# Patient Record
Sex: FEMALE | Race: WHITE | NOT HISPANIC OR LATINO | Employment: OTHER | URBAN - METROPOLITAN AREA
[De-identification: names, ages, dates, MRNs, and addresses within clinical notes are randomized per-mention and may not be internally consistent; named-entity substitution may affect disease eponyms.]

---

## 2020-03-03 ENCOUNTER — TRANSFERRED RECORDS (OUTPATIENT)
Dept: HEALTH INFORMATION MANAGEMENT | Facility: CLINIC | Age: 65
End: 2020-03-03

## 2020-07-21 ENCOUNTER — TRANSFERRED RECORDS (OUTPATIENT)
Dept: HEALTH INFORMATION MANAGEMENT | Facility: CLINIC | Age: 65
End: 2020-07-21

## 2020-07-28 ENCOUNTER — TRANSFERRED RECORDS (OUTPATIENT)
Dept: HEALTH INFORMATION MANAGEMENT | Facility: CLINIC | Age: 65
End: 2020-07-28

## 2020-08-18 ENCOUNTER — TRANSFERRED RECORDS (OUTPATIENT)
Dept: HEALTH INFORMATION MANAGEMENT | Facility: CLINIC | Age: 65
End: 2020-08-18

## 2020-08-20 PROBLEM — E11.9 TYPE 2 DIABETES MELLITUS (H): Status: ACTIVE | Noted: 2017-02-06

## 2020-08-20 PROBLEM — H04.203 WATERY EYES: Status: ACTIVE | Noted: 2019-05-13

## 2020-08-20 PROBLEM — E87.5 HYPERKALEMIA: Status: ACTIVE | Noted: 2018-11-09

## 2020-08-20 PROBLEM — E11.311 DIABETIC MACULAR EDEMA OF RIGHT EYE (H): Status: ACTIVE | Noted: 2017-06-13

## 2020-08-20 PROBLEM — N18.5 CKD (CHRONIC KIDNEY DISEASE) STAGE 5, GFR LESS THAN 15 ML/MIN (H): Status: ACTIVE | Noted: 2019-03-08

## 2020-08-20 PROBLEM — E11.39: Status: ACTIVE | Noted: 2017-10-31

## 2020-08-20 PROBLEM — E87.70 FLUID OVERLOAD, UNSPECIFIED: Status: ACTIVE | Noted: 2018-11-09

## 2020-08-20 PROBLEM — H35.373 EPIRETINAL MEMBRANE (ERM) OF BOTH EYES: Status: ACTIVE | Noted: 2018-07-05

## 2020-08-20 PROBLEM — R80.9 PROTEINURIA: Status: ACTIVE | Noted: 2018-11-09

## 2020-08-20 PROBLEM — N25.81 HYPERPARATHYROIDISM DUE TO RENAL INSUFFICIENCY (H): Status: ACTIVE | Noted: 2019-05-06

## 2020-08-20 PROBLEM — J18.9 PNEUMONIA: Status: ACTIVE | Noted: 2018-03-19

## 2020-08-20 PROBLEM — G72.3 ADYNAMIA: Status: ACTIVE | Noted: 2017-11-09

## 2020-08-20 PROBLEM — H43.10: Status: ACTIVE | Noted: 2017-10-31

## 2020-08-20 PROBLEM — N18.4 CKD (CHRONIC KIDNEY DISEASE) STAGE 4, GFR 15-29 ML/MIN (H): Status: ACTIVE | Noted: 2017-05-12

## 2020-08-20 PROBLEM — N17.9 AKI (ACUTE KIDNEY INJURY) (H): Status: ACTIVE | Noted: 2018-04-06

## 2020-08-20 PROBLEM — Z13.5 SCREENING FOR EYE CONDITION: Status: ACTIVE | Noted: 2017-02-06

## 2020-08-20 PROBLEM — N18.6 ANEMIA IN END-STAGE RENAL DISEASE (H): Status: ACTIVE | Noted: 2019-05-06

## 2020-08-20 PROBLEM — H25.13 AGE-RELATED NUCLEAR CATARACT, BILATERAL: Status: ACTIVE | Noted: 2017-08-30

## 2020-08-20 PROBLEM — E11.3299 BDR (BACKGROUND DIABETIC RETINOPATHY) (H): Status: ACTIVE | Noted: 2017-10-31

## 2020-08-20 PROBLEM — R51.9 HEADACHE: Status: ACTIVE | Noted: 2017-11-09

## 2020-08-20 PROBLEM — H43.10 VITREOUS HEMORRHAGE (H): Status: ACTIVE | Noted: 2017-02-06

## 2020-08-20 PROBLEM — S09.90XA INJURY OF HEAD: Status: ACTIVE | Noted: 2017-11-09

## 2020-08-20 PROBLEM — R25.1 TREMOR: Status: ACTIVE | Noted: 2019-08-23

## 2020-08-20 PROBLEM — R11.2 NAUSEA AND VOMITING: Status: ACTIVE | Noted: 2017-11-09

## 2020-08-20 PROBLEM — H26.9 CATARACT OF BOTH EYES: Status: ACTIVE | Noted: 2018-04-09

## 2020-08-20 PROBLEM — R50.9 FEVER: Status: ACTIVE | Noted: 2017-11-09

## 2020-08-20 PROBLEM — D63.1 ANEMIA IN END-STAGE RENAL DISEASE (H): Status: ACTIVE | Noted: 2019-05-06

## 2020-08-20 PROBLEM — N04.9 NEPHROTIC SYNDROME: Status: ACTIVE | Noted: 2018-11-09

## 2020-10-06 ENCOUNTER — TRANSFERRED RECORDS (OUTPATIENT)
Dept: HEALTH INFORMATION MANAGEMENT | Facility: CLINIC | Age: 65
End: 2020-10-06

## 2020-10-06 ENCOUNTER — TELEPHONE (OUTPATIENT)
Dept: TRANSPLANT | Facility: CLINIC | Age: 65
End: 2020-10-06

## 2020-10-06 LAB — PAP-ABSTRACT: NORMAL

## 2020-10-27 ENCOUNTER — TELEPHONE (OUTPATIENT)
Dept: TRANSPLANT | Facility: CLINIC | Age: 65
End: 2020-10-27

## 2020-12-08 ENCOUNTER — TELEPHONE (OUTPATIENT)
Dept: TRANSPLANT | Facility: CLINIC | Age: 65
End: 2020-12-08

## 2020-12-08 NOTE — TELEPHONE ENCOUNTER
Called pt's home and mobile numbers to confirm she was able to move her dialysis time for 12/9/20 to the afternoon to accommodate a virtual PKE appointment in the morning. Left my contact info for her to return my call.

## 2020-12-08 NOTE — PROGRESS NOTES
"Chief Complaint   Patient presents with     Transplant Evaluation     kidney/pancreas     Height 1.753 m (5' 9\"), weight 95.3 kg (210 lb), not currently breastfeeding.    Selene Denny is a 65 year old female who is being evaluated via a billable video visit.    This telemedicine visit was completed while the patient is out of state in order to maintain continuity of transplant care and to eliminate risk of travel to [place] during COVID-19 pandemic.    The patient has been notified of following:     \"This video visit will be conducted via a call between you and your physician/provider. We have found that certain health care needs can be provided without the need for an in-person physical exam.  This service lets us provide the care you need with a video conversation.  If a prescription is necessary we can send it directly to your pharmacy.  If lab work is needed we can place an order for that and you can then stop by our lab to have the test done at a later time.    Video visits are billed at different rates depending on your insurance coverage.  Please reach out to your insurance provider with any questions.    If during the course of the call the physician/provider feels a video visit is not appropriate, you will not be charged for this service.\"    Patient has given verbal consent for Video visit? Yes  How would you like to obtain your AVS? MyChart  If you are dropped from the video visit, the video invite should be resent to: Other e-mail: USE [employer]- PATIENT IN THE WAITING ROOM  Will anyone else be joining your video visit? No    Video-Visit Details    Type of service:  Video Visit    Video Start Time: 8:37 AM  Video End Time: 9:14 AM    Originating Location (pt. Location): Home    Distant Location (provider location):  [facility]     Platform used for Video Visit: NORMA Langley MD      TRANSPLANT NEPHROLOGY RECIPIENT EVALUATION " NOTE    Assessment and Plan:  # Kidney/Pancreas Transplant Evaluation: Patient is a fair candidate overall. Benefits of a living donor transplant were discussed.    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     # DM Type 2: currently controlled with approximately 35-45 units insulin daily. Given comorbidities, she may be at higher risk for pancreas transplant.    # Cardiac Risk: she will need risk assessment given known history of CAD s/p PCI in 2016 (with residual 50% stenosis of 1st diagonal), ischemic cardiomyopathy (20% at time of PCI, improved to 60% as of 2017), and ECHO abnormalities (aortic stenosis (mean valve gradient 16 mmHg, valve area 1.3 cm2) and pulmonary HTN, not repeated since 2017).    # CLL: diagnosed and treated in 2015. Followed by local oncologist. No evidence of progression as of July 2020.    # Diarrhea: chronic. Improving over the last month with dietary changes, but now with fecal incontinence, and following with local GI. Work up thus far has been unrevealing. No issues with dehydration or passing of pills in stool. She should continue to follow with local GI provider.     # MGUS: will repeat SPEP and serum FLC.     # PAD: obtain images from August 2020 CT c/a/p without contrast done at [place].     # Sensation of Incomplete Bladder Emptying: will need PVR.    # Health Maintenance: Colonoscopy: need records, Mammogram: due this month, PAP: Up to date (October 2020, NIL, neg HPV) and Dental: should be updated if not done in past 6-12 months.     Discussed the risks and benefits of a transplant, including the risk of surgery and immunosuppression medications.  Patient's overall evaluation will be discussed in the Transplant Program's regular meeting with a final recommendation on the patients suitability for transplant to be made at that time.  Patient was seen in conjunction with Dr. Gerson Dorantes as part of a shared  visit.    PHYSICIAN ATTESTATION AND EVALUATION  Patient was seen by myself, Dr. Gerson Dorantes, in conjunction with Akila Layne PA-C as part of a shared visit.    I personally reviewed the patient's past medical and surgical history, vital signs, medications and pertinent laboratory results and radiology findings.  Present and past medical history, along with significant physical exam findings were all reviewed with CHARISMA.    Allen findings:  Selene Denny is a 65 year old year old female with ESKD from diabetes mellitus type 2, who presents for kidney/pancreas transplant evaluation.  Patient reports feeling okay overall with some medical complaints.    Key management decisions made by me and discussed with CHARISMA include the following, with full Assessment and Plan noted above by CHARISMA:  # Kidney/Pancreas Transplant Evaluation: Patient is a fair candidate overall. Benefits of a living donor transplant were discussed.    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    # DM Type 2: Stable on insuline, but with end-organ disease.  She may benefit from a pancreas transplant, but would be at higher risk.    # Cardiac Risk: Patient with known CAD, s/p PCI and h/o ischemic cardiomyopathy, but improved to normal with last cardiac echo.  Patient will require Cardiology evaluation prior to transplant.    # PAD: Noted on previous CT imaging.  Will obtain CT scan and have Transplant Surgery review.    # CLL: Patient was diagnosed in 2015 with no evidence of progression.  Followed by Oncology.    # Chronic Diarrhea: Some improvement lately, but also has fecal incontinence.  Patient is being evaluated by local GI.    # MGUS: Followed by Heme/Onc and will repeat serum kappa/lambda light chains and SPEP.    # Sensation of Incomplete Bladder Emptying: Will obtain PVR and if abnormal, recommend Urology evaluation.    Gerson Dorantes,  MD    Evaluation:  Selene Denny was seen in consultation at the request of Dr. Maris Rajput for evaluation as a potential kidney/pancreas transplant recipient.    Reason for Visit:  Selene Denny is a 65-year-old female with ESKD from diabetes mellitus type 2, who presents for kidney/pancreas transplant evaluation.    History of Present Illness:         Kidney Disease Hx: Diagnosed with diabetic nephropathy by kidney biopsy in January 2016 to evaluate nephrotic-range proteinuria with normal creatinine. Her kidney function continued to deteriorate, and she initiated dialysis in May 2019, which is going well. She still has urine output. She has sensation of incomplete bladder emptying. She takes midodrine 5 mg (1-2 pills) on dialysis days only when needed. She was evaluated at [facility] for kidney transplant this Summer 2020, but is here as she is interested in pancreas transplant. She does not recall previously being referred earlier for transplant evaluation.       Kidney Disease Dx: Diabetes mellitus type 2       Biopsy Proven: Yes         On Dialysis: Yes, Date initiated: May 2019 and Dialysis Type: [facility]       Primary Nephrologist: [facility]       H/o Kidney Stones: No       H/o Recurrent/Frequent UTI: No         Diabetic Hx: Type 2        Diagnosis Date: 1995       Medication History: on oral medications before transitioning to insulin a few years ago. Currently using novolog 5-10 units with meals and 13 units of lantus. Has CGM. Followed by endocrine.       Diabetic Control: Borderline control (HbA1c 7-9%)   Last HbA1c: 7.2%       Hypoglycemic Unawareness: ER visit August 2020 with sugars of 20 and was asymptomatic, but no other events       End-Organ Damage due to DM: Nephropathy and Peripheral neuropathy. Foot ulcers. No retinopathy         Cardiac/Vascular Disease Risk Factors:        - CAD s/p PCI to mid circumflex and mid LAD with SENDY March 2016. There was also an 80% lesion in the  1st diagonal that was treated with angioplasty but only partial improvement with 50% residual stenosis. EF 20% at that time. EF improved to 60% on last ECHO in 2017. That ECHO also showed aortic stenosis (mean valve gradient 16 mmHg, valve area 1.3 cm2), and moderate pulmonary HTN with PA systolic pressure of 58 mmHg.         Viral Serology Status       CMV IgG Antibody: Unknown       EBV IgG Antibody: Unknown         Volume Status/Weight:        BMI: Body mass index is 31.01 kg/m .         Functional Capacity/Frailty:        She stays physically active with walking. Does elliptical at home for 15 minutes at a time. Goes out to local shopping areas to walk around stores. Can walk for 60 minutes uninterrupted. No chest pain, SOB, or claudication.      Fatigue/Decreased Energy: [x] No [] Yes    Chest Pain or SOB with Exertion: [x] No [] Yes    Significant Weight Change: [x] No [] Yes    Nausea, Vomiting or Diarrhea: [x] No [] Yes    Fever, Sweats or Chills:  [x] No [] Yes    Leg Swelling [x] No [] Yes        History of Cancer:   - CLL: diagnosed January 2015. Treated with bendamustane and rituximab, therapy completed in July 2015. Continues to follow with oncology. Last seen July 2020 without evidence of progression.    - MGUS: IgG kappa, identified 2016    Other Significant Medical Issues:   - Chronic diarrhea: present since starting metformin years ago. Now following with local GI. Symptoms improved in the last month with keeping a food diary and avoiding food triggers. Uses imodium as needed. Has undergone extensive work up, unclear etiology, but attributed to diabetes. Recent October 2020 GI note mentions patient has fecal incontinence during the day and night. Patient reported symptoms occurring 1-2 times per week, having diarrhea for about 4-5 hours, then resolves. Non-bloody, non-watery.    Review of Systems:  A comprehensive review of systems was obtained and negative, except as noted in the HPI or  PMH.    Past Medical History:   Medical record was reviewed and PMH was discussed with patient and noted below.  Past Medical History:   Diagnosis Date     Chronic diarrhea      CLL (chronic lymphocytic leukemia) (H)      Coronary artery disease     Heart Attack      End stage kidney disease (H)      History of blood transfusion     2017     Hypertension      MGUS (monoclonal gammopathy of unknown significance)      PAD (peripheral artery disease) (H)      Type 2 diabetes mellitus (H)        Past Social History:   Past Surgical History:   Procedure Laterality Date     BIOPSY KIDNEY  01/2016     CHOLECYSTECTOMY       COLONOSCOPY      2017 at [place]      CREATE FISTULA ARTERIOVENOUS UPPER EXTREMITY       CV ANGIOGRAPHY       HEART CATH, ANGIOPLASTY       Personal history of bleeding or anesthesia problems: No    Family History:  Family History   Problem Relation Age of Onset     Kidney Disease No family hx of        Personal History:   Social History     Socioeconomic History     Marital status:      Spouse name: Not on file     Number of children: Not on file     Years of education: Not on file     Highest education level: Not on file   Occupational History     Not on file   Social Needs     Financial resource strain: Not on file     Food insecurity     Worry: Not on file     Inability: Not on file     Transportation needs     Medical: Not on file     Non-medical: Not on file   Tobacco Use     Smoking status: Never Smoker     Smokeless tobacco: Never Used   Substance and Sexual Activity     Alcohol use: Never     Frequency: Never     Drug use: Never     Sexual activity: Not on file   Lifestyle     Physical activity     Days per week: Not on file     Minutes per session: Not on file     Stress: Not on file   Relationships     Social connections     Talks on phone: Not on file     Gets together: Not on file     Attends Hindu service: Not on file     Active member of club or organization: Not on file      Attends meetings of clubs or organizations: Not on file     Relationship status: Not on file     Intimate partner violence     Fear of current or ex partner: Not on file     Emotionally abused: Not on file     Physically abused: Not on file     Forced sexual activity: Not on file   Other Topics Concern     Parent/sibling w/ CABG, MI or angioplasty before 65F 55M? Not Asked   Social History Narrative     Not on file       Allergies:  No Known Allergies    Medications:  Current Outpatient Medications   Medication Sig     acetaminophen (TYLENOL) 325 MG tablet Take 325-650 mg by mouth     atorvastatin (LIPITOR) 40 MG tablet Take 40 mg by mouth     blood glucose (CONTOUR NEXT TEST) test strip USE TO TEST BLOOD SUGARS 3 TIMES A DAY     carvedilol (COREG) 12.5 MG tablet Take 12.5 mg by mouth     darbepoetin mike (ARANESP) 25 MCG/ML injection      ferric gluconate (FERRLECIT) 12.5 MG/ML injection      furosemide (LASIX) 40 MG tablet Take 80 mg by mouth     insulin aspart (NOVOLOG FLEXPEN) 100 UNIT/ML pen Inject 5 units with breakfast, 8 units with lunch and supper plus correction of 1 unit per 50 points over 150;  Max daily 80 Units     insulin degludec (TRESIBA) 100 UNIT/ML pen Inject 13 Units Subcutaneous     levothyroxine (SYNTHROID/LEVOTHROID) 50 MCG tablet TAKE ONE TAB BY MOUTH EVERY DAY     loperamide (IMODIUM) 2 MG capsule TAKE 2 CAPS AFTER THE FIRST LOOSE STOOL THEN 1 CAP AFTER EACH LOOSE STOOL MAX 8 CAPS PER DAY     Multiple Vitamins-Minerals (PRESERVISION/LUTEIN) CAPS Take 1 capsule by mouth     polyvinyl alcohol (LIQUIFILM TEARS) 1.4 % ophthalmic solution 1 drop     sevelamer carbonate (RENVELA) 800 MG tablet TAKE 1 TABLET BY MOUTH 3X PER DAY WITH MEALS DO NOT GIVE ON AN EMPTY STOMACH MUST TAKE WITH FOOD     No current facility-administered medications for this visit.          Exam:  GENERAL: Healthy, alert and no distress  EYES: Eyes grossly normal to inspection.  No discharge or erythema, or obvious  scleral/conjunctival abnormalities.  RESP: No audible wheeze, cough, or visible cyanosis.  No visible retractions or increased work of breathing.    SKIN: Visible skin clear. No significant rash, abnormal pigmentation or lesions.  NEURO: Cranial nerves grossly intact.  Mentation and speech appropriate for age.  PSYCH: Mentation appears normal, affect normal/bright, judgement and insight intact, normal speech and appearance well-groomed.

## 2020-12-08 NOTE — TELEPHONE ENCOUNTER
Called pt to confirm she will be able to attend her virtual PKE appointment tomorrow (12/9/20).  Pt stated she will be home from 8 AM -11:30 AM and then she will be leaving for dialysis.  Pt told me she received a code that is not working to do the appointment on [facility].  Will escalate this to Lucero Brady.

## 2020-12-08 NOTE — Clinical Note
Akila Rivas had asked me to reach out and confirm this pt had moved her dialysis from AM to PM to accommodate her virtual PKE appointment. I spoke with the pt and she will be available from [phone] AM tomorrow-do you think that will be sufficient time? Pt also said she is supposed to use [facility] [employer] for her appointment and she is having issues with a code.  55

## 2020-12-09 ENCOUNTER — ALLIED HEALTH/NURSE VISIT (OUTPATIENT)
Dept: TRANSPLANT | Facility: CLINIC | Age: 65
End: 2020-12-09
Attending: INTERNAL MEDICINE
Payer: MEDICARE

## 2020-12-09 VITALS — BODY MASS INDEX: 31.1 KG/M2 | HEIGHT: 69 IN | WEIGHT: 210 LBS

## 2020-12-09 DIAGNOSIS — N18.6 END STAGE KIDNEY DISEASE (H): ICD-10-CM

## 2020-12-09 DIAGNOSIS — E11.65 TYPE 2 DIABETES MELLITUS WITH HYPERGLYCEMIA, UNSPECIFIED WHETHER LONG TERM INSULIN USE (H): ICD-10-CM

## 2020-12-09 DIAGNOSIS — N18.6 TYPE 2 DIABETES MELLITUS WITH CHRONIC KIDNEY DISEASE ON CHRONIC DIALYSIS, WITH LONG-TERM CURRENT USE OF INSULIN (H): Primary | ICD-10-CM

## 2020-12-09 DIAGNOSIS — N18.6 END STAGE RENAL DISEASE (H): ICD-10-CM

## 2020-12-09 DIAGNOSIS — Z76.82 ORGAN TRANSPLANT CANDIDATE: ICD-10-CM

## 2020-12-09 DIAGNOSIS — E11.22 TYPE 2 DIABETES MELLITUS WITH CHRONIC KIDNEY DISEASE ON CHRONIC DIALYSIS, WITH LONG-TERM CURRENT USE OF INSULIN (H): Primary | ICD-10-CM

## 2020-12-09 DIAGNOSIS — D47.2 MGUS (MONOCLONAL GAMMOPATHY OF UNKNOWN SIGNIFICANCE): ICD-10-CM

## 2020-12-09 DIAGNOSIS — E11.9 DIABETES MELLITUS, TYPE 2 (H): ICD-10-CM

## 2020-12-09 DIAGNOSIS — Z01.818 ENCOUNTER FOR PRE-TRANSPLANT EVALUATION FOR KIDNEY AND PANCREAS TRANSPLANT: ICD-10-CM

## 2020-12-09 DIAGNOSIS — N18.6 ESRD (END STAGE RENAL DISEASE) (H): ICD-10-CM

## 2020-12-09 DIAGNOSIS — Z99.2 TYPE 2 DIABETES MELLITUS WITH CHRONIC KIDNEY DISEASE ON CHRONIC DIALYSIS, WITH LONG-TERM CURRENT USE OF INSULIN (H): Primary | ICD-10-CM

## 2020-12-09 DIAGNOSIS — I73.9 PAD (PERIPHERAL ARTERY DISEASE) (H): ICD-10-CM

## 2020-12-09 DIAGNOSIS — Z79.4 TYPE 2 DIABETES MELLITUS WITH CHRONIC KIDNEY DISEASE ON CHRONIC DIALYSIS, WITH LONG-TERM CURRENT USE OF INSULIN (H): Primary | ICD-10-CM

## 2020-12-09 PROCEDURE — 99204 OFFICE O/P NEW MOD 45 MIN: CPT | Mod: 95 | Performed by: SURGERY

## 2020-12-09 PROCEDURE — 99205 OFFICE O/P NEW HI 60 MIN: CPT | Mod: 95

## 2020-12-09 RX ORDER — ACETAMINOPHEN 325 MG/1
325-650 TABLET ORAL
COMMUNITY
Start: 2020-12-03

## 2020-12-09 RX ORDER — ATORVASTATIN CALCIUM 40 MG/1
40 TABLET, FILM COATED ORAL
COMMUNITY
Start: 2020-10-06 | End: 2021-10-11

## 2020-12-09 RX ORDER — LEVOTHYROXINE SODIUM 50 UG/1
TABLET ORAL
COMMUNITY
Start: 2020-04-21

## 2020-12-09 RX ORDER — POLYVINYL ALCOHOL 14 MG/ML
1 SOLUTION/ DROPS OPHTHALMIC
COMMUNITY

## 2020-12-09 RX ORDER — SEVELAMER CARBONATE 800 MG/1
TABLET, FILM COATED ORAL
COMMUNITY
Start: 2020-08-17

## 2020-12-09 RX ORDER — FUROSEMIDE 40 MG
80 TABLET ORAL
COMMUNITY
Start: 2020-06-03

## 2020-12-09 RX ORDER — INSULIN ASPART 100 [IU]/ML
INJECTION, SOLUTION INTRAVENOUS; SUBCUTANEOUS
COMMUNITY
Start: 2020-12-08

## 2020-12-09 RX ORDER — SODIUM FERRIC GLUCONATE COMPLEX IN SUCROSE 12.5 MG/ML
INJECTION INTRAVENOUS
COMMUNITY

## 2020-12-09 RX ORDER — LOPERAMIDE HCL 2 MG
CAPSULE ORAL
COMMUNITY
Start: 2020-10-06

## 2020-12-09 RX ORDER — CARVEDILOL 12.5 MG/1
12.5 TABLET ORAL
COMMUNITY
Start: 2020-09-04

## 2020-12-09 RX ORDER — VIT C/E/CUPERIC/ZINC/LUTEIN 226-90-0.8
1 CAPSULE ORAL
COMMUNITY

## 2020-12-09 ASSESSMENT — MIFFLIN-ST. JEOR: SCORE: 1566.93

## 2020-12-09 ASSESSMENT — PAIN SCALES - GENERAL: PAINLEVEL: NO PAIN (0)

## 2020-12-09 NOTE — PROGRESS NOTES
Psychosocial Assessment  Patient Name/ Age: Selene Denny 65 year old   Medical Record #: [id number]  Duration of Interview:     40   min  Process:   Telephone Interview                (counseling < 50%)   Present at Appointment: Selene        :FRANCESCA Cabral, [facility] Date:  December 9, 2020        Type of transplant: Kidney/Pancreas    Donor type:   Selene indicated she does not know of any potential donors at this time.   [facility]   Prior Transplants:    No Status of Transplant:       Current Living Situation    Location:   [place] [id number]  With Whom:  Edis and sonsilvia Burk (35)       Family/ Social Support:    Kal have another son Wagner (38) who lives in [place], [place].  They have three grandchildren.  Selene has two brothers ([place] and [place]) and two sisters ([place] and [facility]).   Available, helpful   Committed relationship:  Kal have been  for 44 years.   Stable/supportive   Other supports:   Selene indicated she does not have any friends. None available       Activities/ Functional Ability    Current level:  Selene wears corrective lenses. Ambulatory, visually impaired and independent with ADL's     Transportation Drives self       Vocational/Employment/Financial     Employment   Disabled   Job Description      Income  Edis is also retired.   SSDI   Insurance  Medicare Parts A and B, BC/BS Medicare Supplement Plan and Part D through [employer].  Selene indicated she pays her own premiums.    At this time, patient can afford medication costs:  Yes  Medicare       Medical Status    Current mode of treatment for ESRD Dialysis   Complications - Diabetes controlled with insulin. Neuropathy and Glucose Unawareness       Behavioral    Tobacco Use No Chemical Dependency No       Psychiatric Impairment No  Selene indicated she used to be on medication for depression but no longer.  She does not believe she has any issues with depression at this  time.    Reading ability Good  Education Level: Bachelors Degree Recent Legal History No            Coping Style/Strategies: Selene indicated when under stress she will sleep.     Ability to Adhere to Complex Medical Regime: Yes     Adherence History:  Selene indicated she will usually follow her physician's recommendations.        Education  _X_ Medicare  _X_ Rehabilitation  _X_ Donor issues  _X_ Community resources  _X_ Post discharge housing  _X_ Financial resources  _X_ Medical insurance options  _X_ Psych adjustment  _X_ Family adjustment  _X_ Health Care Directive - Selene indicated she will work on completing a Health Care Directive. Psychosocial Risks of Transplant Reviewed and Discussed:  _X_ Increased stress related to emotional,            family, social, employment or financial           situation  _X_ Affect on work and/or disability benefits  _X_ Affect on future life insurance  _X_ Transplant outcome expectations may           not be met  _X_ Mental Health Risks: anxiety,           depression, PTSD, guilt, grief and           chronic fatigue     Notable Items:   Selene indicated one of the physicians indicated she may not be a pancreas transplant candidate at this time.  Discussed obtaining a kidney transplant at a facility closer to her home as the only reason she considered coming to [facility] was due to a pancreas transplant.  Selene was unsure where she would stay locally post transplant.       Final Evaluation/Assessment   Patient seemed to process information well. Appeared well informed, motivated and able to follow post transplant requirements. Behavior was appropriate during interview. Has adequate income and insurance coverage. Adequate social support. No major contraindications noted for transplant.  At this time patient appears to understand the risks and benefits of transplant.      Recommendation  Acceptable    Selection Criteria Met:  Plan for support Yes   Chemical Dependence Yes    Smoking Yes   Mental Health Yes   Adequate Finances Yes    Signature: FRANCESCA Cabral, [facility]   Title: Clinical

## 2020-12-09 NOTE — LETTER
"    12/9/2020         RE: Selene Denny  [address]  [place] [address]        Dear Colleague,    Thank you for referring your patient, Selene Denny, to the [facility]. Please see a copy of my visit note below.    Selene Denny is a 65 year old female who is being evaluated via a billable video visit.    This telemedicine visit was completed while the patient is out of state in order to maintain continuity of transplant care and to eliminate risk of travel to [place] during COVID-19 pandemic.  The patient has been notified of following:       \"This video visit will be conducted via a call between you and your physician/provider. We have found that certain health care needs can be provided without the need for an in-person physical exam.  This service lets us provide the care you need with a video conversation.  If a prescription is necessary we can send it directly to your pharmacy.  If lab work is needed we can place an order for that and you can then stop by our lab to have the test done at a later time.    Video visits are billed at different rates depending on your insurance coverage.  Please reach out to your insurance provider with any questions.    If during the course of the call the physician/provider feels a video visit is not appropriate, you will not be charged for this service.\"    Patient has given verbal consent for Video visit? Yes  How would you like to obtain your AVS? MyChart  If you are dropped from the video visit, the video invite should be resent to: Other e-mail: USE [email or web address]- PATIENT IN THE WAITING ROOM  Will anyone else be joining your video visit? No    Video-Visit Details    Type of service:  Video Visit    Video Start Time: 8:37 AM  Video End Time: 9:14 AM    Originating Location (pt. Location): Home    Distant Location (provider location):  [facility]     Platform used for Video Visit: Yojana Layne, " NORMA Dorantes MD      TRANSPLANT NEPHROLOGY RECIPIENT EVALUATION NOTE    Assessment and Plan:  # Kidney/Pancreas Transplant Evaluation: Patient is a fair candidate overall. Benefits of a living donor transplant were discussed.    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     # DM Type 2: currently controlled with approximately 35-45 units insulin daily. Given comorbidities, she may be at higher risk for pancreas transplant.    # Cardiac Risk: she will need risk assessment given known history of CAD s/p PCI in 2016 (with residual 50% stenosis of 1st diagonal), ischemic cardiomyopathy (20% at time of PCI, improved to 60% as of 2017), and ECHO abnormalities (aortic stenosis (mean valve gradient 16 mmHg, valve area 1.3 cm2) and pulmonary HTN, not repeated since 2017).    # CLL: diagnosed and treated in 2015. Followed by local oncologist. No evidence of progression as of July 2020.    # Diarrhea: chronic. Improving over the last month with dietary changes, but now with fecal incontinence, and following with local GI. Work up thus far has been unrevealing. No issues with dehydration or passing of pills in stool. She should continue to follow with local GI provider.     # MGUS: will repeat SPEP and serum FLC.     # PAD: obtain images from August 2020 CT c/a/p without contrast done at [place].     # Sensation of Incomplete Bladder Emptying: will need PVR.    # Health Maintenance: Colonoscopy: need records, Mammogram: due this month, PAP: Up to date (October 2020, NIL, neg HPV) and Dental: should be updated if not done in past 6-12 months.     Discussed the risks and benefits of a transplant, including the risk of surgery and immunosuppression medications.  Patient's overall evaluation will be discussed in the Transplant Program's regular meeting with a final recommendation on the patients suitability for transplant to be made at that  time.  Patient was seen in conjunction with Dr. Gerson Dorantes as part of a shared visit.    PHYSICIAN ATTESTATION AND EVALUATION  Patient was seen by myself, Dr. Gerson Dorantes, in conjunction with Akila aLyne PA-C as part of a shared visit.    I personally reviewed the patient's past medical and surgical history, vital signs, medications and pertinent laboratory results and radiology findings.  Present and past medical history, along with significant physical exam findings were all reviewed with CHARISMA.    Allen findings:  Selene Denny is a 65 year old year old female with ESKD from diabetes mellitus type 2, who presents for kidney/pancreas transplant evaluation.  Patient reports feeling okay overall with some medical complaints.    Key management decisions made by me and discussed with CHARISMA include the following, with full Assessment and Plan noted above by CHAIRSMA:  # Kidney/Pancreas Transplant Evaluation: Patient is a fair candidate overall. Benefits of a living donor transplant were discussed.    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    # DM Type 2: Stable on insuline, but with end-organ disease.  She may benefit from a pancreas transplant, but would be at higher risk.    # Cardiac Risk: Patient with known CAD, s/p PCI and h/o ischemic cardiomyopathy, but improved to normal with last cardiac echo.  Patient will require Cardiology evaluation prior to transplant.    # PAD: Noted on previous CT imaging.  Will obtain CT scan and have Transplant Surgery review.    # CLL: Patient was diagnosed in 2015 with no evidence of progression.  Followed by Oncology.    # Chronic Diarrhea: Some improvement lately, but also has fecal incontinence.  Patient is being evaluated by local GI.    # MGUS: Followed by Heme/Onc and will repeat serum kappa/lambda light chains and SPEP.    # Sensation of Incomplete Bladder Emptying: Will obtain PVR and if  abnormal, recommend Urology evaluation.    Gerson Dorantes MD    Evaluation:  Selene Denny was seen in consultation at the request of Dr. Maris Rajput for evaluation as a potential kidney/pancreas transplant recipient.    Reason for Visit:  Selene Denny is a 65-year-old female with ESKD from diabetes mellitus type 2, who presents for kidney/pancreas transplant evaluation.    History of Present Illness:         Kidney Disease Hx: Diagnosed with diabetic nephropathy by kidney biopsy in January 2016 to evaluate nephrotic-range proteinuria with normal creatinine. Her kidney function continued to deteriorate, and she initiated dialysis in May 2019, which is going well. She still has urine output. She has sensation of incomplete bladder emptying. She takes midodrine 5 mg (1-2 pills) on dialysis days only when needed. She was evaluated at [place] for kidney transplant this Summer 2020, but is here as she is interested in pancreas transplant. She does not recall previously being referred earlier for transplant evaluation.       Kidney Disease Dx: Diabetes mellitus type 2       Biopsy Proven: Yes         On Dialysis: Yes, Date initiated: May 2019 and Dialysis Type: Incenter HD       Primary Nephrologist: [place] [facility]       H/o Kidney Stones: No       H/o Recurrent/Frequent UTI: No         Diabetic Hx: Type 2        Diagnosis Date: 1995       Medication History: on oral medications before transitioning to insulin a few years ago. Currently using novolog 5-10 units with meals and 13 units of lantus. Has CGM. Followed by endocrine.       Diabetic Control: Borderline control (HbA1c 7-9%)   Last HbA1c: 7.2%       Hypoglycemic Unawareness: ER visit August 2020 with sugars of 20 and was asymptomatic, but no other events       End-Organ Damage due to DM: Nephropathy and Peripheral neuropathy. Foot ulcers. No retinopathy         Cardiac/Vascular Disease Risk Factors:        - CAD s/p PCI to mid circumflex and  mid LAD with SENDY March 2016. There was also an 80% lesion in the 1st diagonal that was treated with angioplasty but only partial improvement with 50% residual stenosis. EF 20% at that time. EF improved to 60% on last ECHO in 2017. That ECHO also showed aortic stenosis (mean valve gradient 16 mmHg, valve area 1.3 cm2), and moderate pulmonary HTN with PA systolic pressure of 58 mmHg.         Viral Serology Status       CMV IgG Antibody: Unknown       EBV IgG Antibody: Unknown         Volume Status/Weight:        BMI: Body mass index is 31.01 kg/m .         Functional Capacity/Frailty:        She stays physically active with walking. Does elliptical at home for 15 minutes at a time. Goes out to local shopping areas to walk around stores. Can walk for 60 minutes uninterrupted. No chest pain, SOB, or claudication.      Fatigue/Decreased Energy: [x] No [] Yes    Chest Pain or SOB with Exertion: [x] No [] Yes    Significant Weight Change: [x] No [] Yes    Nausea, Vomiting or Diarrhea: [x] No [] Yes    Fever, Sweats or Chills:  [x] No [] Yes    Leg Swelling [x] No [] Yes        History of Cancer:   - CLL: diagnosed January 2015. Treated with bendamustane and rituximab, therapy completed in July 2015. Continues to follow with oncology. Last seen July 2020 without evidence of progression.    - MGUS: IgG kappa, identified 2016    Other Significant Medical Issues:   - Chronic diarrhea: present since starting metformin years ago. Now following with local GI. Symptoms improved in the last month with keeping a food diary and avoiding food triggers. Uses imodium as needed. Has undergone extensive work up, unclear etiology, but attributed to diabetes. Recent October 2020 GI note mentions patient has fecal incontinence during the day and night. Patient reported symptoms occurring 1-2 times per week, having diarrhea for about 4-5 hours, then resolves. Non-bloody, non-watery.    Review of Systems:  A comprehensive review of systems was  obtained and negative, except as noted in the HPI or PMH.    Past Medical History:   Medical record was reviewed and PMH was discussed with patient and noted below.  Past Medical History:   Diagnosis Date     Chronic diarrhea      CLL (chronic lymphocytic leukemia) (H)      Coronary artery disease     Heart Attack      End stage kidney disease (H)      History of blood transfusion     2017     Hypertension      MGUS (monoclonal gammopathy of unknown significance)      PAD (peripheral artery disease) (H)      Type 2 diabetes mellitus (H)        Past Social History:   Past Surgical History:   Procedure Laterality Date     BIOPSY KIDNEY  01/2016     CHOLECYSTECTOMY       COLONOSCOPY      2017 at [place]      CREATE FISTULA ARTERIOVENOUS UPPER EXTREMITY       CV ANGIOGRAPHY       HEART CATH, ANGIOPLASTY       Personal history of bleeding or anesthesia problems: No    Family History:  Family History   Problem Relation Age of Onset     Kidney Disease No family hx of        Personal History:   Social History     Socioeconomic History     Marital status:      Spouse name: Not on file     Number of children: Not on file     Years of education: Not on file     Highest education level: Not on file   Occupational History     Not on file   Social Needs     Financial resource strain: Not on file     Food insecurity     Worry: Not on file     Inability: Not on file     Transportation needs     Medical: Not on file     Non-medical: Not on file   Tobacco Use     Smoking status: Never Smoker     Smokeless tobacco: Never Used   Substance and Sexual Activity     Alcohol use: Never     Frequency: Never     Drug use: Never     Sexual activity: Not on file   Lifestyle     Physical activity     Days per week: Not on file     Minutes per session: Not on file     Stress: Not on file   Relationships     Social connections     Talks on phone: Not on file     Gets together: Not on file     Attends Orthodoxy service: Not on file      Active member of club or organization: Not on file     Attends meetings of clubs or organizations: Not on file     Relationship status: Not on file     Intimate partner violence     Fear of current or ex partner: Not on file     Emotionally abused: Not on file     Physically abused: Not on file     Forced sexual activity: Not on file   Other Topics Concern     Parent/sibling w/ CABG, MI or angioplasty before 65F 55M? Not Asked   Social History Narrative     Not on file       Allergies:  No Known Allergies    Medications:  Current Outpatient Medications   Medication Sig     acetaminophen (TYLENOL) 325 MG tablet Take 325-650 mg by mouth     atorvastatin (LIPITOR) 40 MG tablet Take 40 mg by mouth     blood glucose (CONTOUR NEXT TEST) test strip USE TO TEST BLOOD SUGARS 3 TIMES A DAY     carvedilol (COREG) 12.5 MG tablet Take 12.5 mg by mouth     darbepoetin mike (ARANESP) 25 MCG/ML injection      ferric gluconate (FERRLECIT) 12.5 MG/ML injection      furosemide (LASIX) 40 MG tablet Take 80 mg by mouth     insulin aspart (NOVOLOG FLEXPEN) 100 UNIT/ML pen Inject 5 units with breakfast, 8 units with lunch and supper plus correction of 1 unit per 50 points over 150;  Max daily 80 Units     insulin degludec (TRESIBA) 100 UNIT/ML pen Inject 13 Units Subcutaneous     levothyroxine (SYNTHROID/LEVOTHROID) 50 MCG tablet TAKE ONE TAB BY MOUTH EVERY DAY     loperamide (IMODIUM) 2 MG capsule TAKE 2 CAPS AFTER THE FIRST LOOSE STOOL THEN 1 CAP AFTER EACH LOOSE STOOL MAX 8 CAPS PER DAY     Multiple Vitamins-Minerals (PRESERVISION/LUTEIN) CAPS Take 1 capsule by mouth     polyvinyl alcohol (LIQUIFILM TEARS) 1.4 % ophthalmic solution 1 drop     sevelamer carbonate (RENVELA) 800 MG tablet TAKE 1 TABLET BY MOUTH 3X PER DAY WITH MEALS DO NOT GIVE ON AN EMPTY STOMACH MUST TAKE WITH FOOD     No current facility-administered medications for this visit.          Exam:  GENERAL: Healthy, alert and no distress  EYES: Eyes grossly normal to  inspection.  No discharge or erythema, or obvious scleral/conjunctival abnormalities.  RESP: No audible wheeze, cough, or visible cyanosis.  No visible retractions or increased work of breathing.    SKIN: Visible skin clear. No significant rash, abnormal pigmentation or lesions.  NEURO: Cranial nerves grossly intact.  Mentation and speech appropriate for age.  PSYCH: Mentation appears normal, affect normal/bright, judgement and insight intact, normal speech and appearance well-groomed.        Again, thank you for allowing me to participate in the care of your patient.        Sincerely,        Gerson Dorantes MD

## 2020-12-09 NOTE — PROGRESS NOTES
Transplant Surgery Consult Note    Medical record number: [id number]  YOB: 1955,   Consult requested by Dr. Martinez for evaluation of kidney and pancreas transplant candidacy.    Assessment and Recommendations: Ms. Denny is a fair candidate for transplantation and has a good understanding of the risks and benefits of this approach to management of renal failure and diabetes. The following issues should be addressed prior to transplant:     66 yo with type 2 DM  On dialysis since 2019  Diabetic for 36 years  On insulin in the last couple of years  Insulin 35-45 untis/d  A1C 7.2  Triopathy+  No live donors at this time   But has several relatives she might ask    Based on risk factors, age and abdominal profile would recommend a kidney first and then plan on a ELIU if her kidney goes well and she's stable.    However, the odds of her qualifying for ELIU are low at this time    Ms. Denny has End stage renal failure due to diabetes mellitus type 2 whose condition is not expected to resolve, is expected to progress, and is expected to continue to develop related comorbid conditions.  Cardiology consult for cardiac risk stratification to be ordered: Yes  CT abdomen and pelvis without contrast to be ordered for assessment of vascular targets: Yes  Transplant listing labs ordered to include HLA, ABOx2, Cr, etc.  Dietician consult ordered: Yes  Social work consult ordered: Yes  Imaging reports reviewed:  yes  Radiology images reviewed:no      The majority of our visit was spent in counselling, discussing the medical and surgical risks of living or  donor kidney and pancreas transplantation, either in a simultaneous or sequential fashion. I contrasted approximate wait time for SPK vs living vs  donor kidneys from normal (0-85%) or higher (%) kidney donor profile index (KDPI) donors and their associated outcomes. I would not recommend this individual to consider kidneys from high KDPI  donors. The reason for this decision is best summarized as: multi-organ transplant candidate.  Access to transplant will be impacted by living donor availability and overall candidacy for SPK, as well as the influence of blood type and degree of sensitization. We discussed advantages of preemptive transplant as well as living donor kidney transplant, and graft and patient survival outcomes associated with these options. Potential surgical complications of kidney and pancreas transplantation include bleeding, clotting, infection, wound complications, anastomotic failure and other issues such as cardiac complications, pneumonia, deep venous thrombosis, pulmonary embolism, post transplant diabetes and death. We discussed the need for protocol biopsy of the kidney and the possible need for a ureteral stent (and subsequent removal). We discussed benefits and risks associated with different approaches to exocrine drainage of pancreatic secretions. We also discussed differences in the average length of stay, recovery process, and posttransplant lab and monitoring protocol. We discussed the risk of graft rejection and recurrent diabetic nephropathy in the setting of poor glycemic control. I emphasized the need for strict immunosuppression adherence and the potential for complications of immunosuppression such as skin cancer or lymphoma, as well as a very low but not zero risk of donor-derived disease transmission risks (infection, cancer). Ms. Denny asked good questions and the patient's candidacy will be reviewed at our Multidisciplinary Selection Committee. Thank you for the opportunity to participate in Ms. Denny's care.    Total time: 45 minutes  Counselling time: 35 minutes    .  ---------------------------------------------------------------------------------------------------    HPI: Ms. Denny has End stage renal failure due to diabetes mellitus type 2. The patient has had diabetes for 36 years. Management is by  Humalog per diabetic educator  . The patient usually checks her blood sugar 3 times/day.   The diabetes is controlled.    Complications of diabetes include:    Retinopathy:  Yes   Neuropathy: Yes   Gastroparesis:  No    The patient is on dialysis.    Has potential kidney donors:  Doesn't know .  Interested in participation in paired exchange if a donor is willing: Doesn't know     The patient has the following pertinent history:       No    Yes  Dialysis:    []      [x] via:       Blood Transfusion                  []      [x]  Number of units:   Most recently:  Pregnancy:    []      [x] Number:       Previous Transplant:  [x]      [] Details:    Cancer    []      [x] Comment: CLL   Kidney stones   [x]      [] Comment:      Recurrent infections  [x]      []  Type:                  Bladder dysfunction  [x]      [] Cause:    Claudication   [x]      [] Distance:    Previous Amputation  []      [x] Cause:     Chronic anticoagulation  []      [x] Indication:  Mormon  []      [x]     Past Medical History:   Diagnosis Date     Cancer (H)     CLL 2015     Congestive heart failure (H)      Coronary artery disease     Heart Attack      Diabetes (H)     Type 2 DM/Insulin Use      ESRD (end stage renal disease) (H)      History of blood transfusion     2017     Hypertension      Thyroid disease      Past Surgical History:   Procedure Laterality Date     ABDOMEN SURGERY      Gallbladder removed 4 years ago.      BIOPSY       CARDIAC SURGERY       COLONOSCOPY      2017 at [facility]      CORONARY ANGIOGRAPHY ADULT ORDER       GI SURGERY       HEART CATH, ANGIOPLASTY       No family history on file.  Social History     Socioeconomic History     Marital status:      Spouse name: Not on file     Number of children: Not on file     Years of education: Not on file     Highest education level: Not on file   Occupational History     Not on file   Social Needs     Financial resource strain: Not on file     Food insecurity      Worry: Not on file     Inability: Not on file     Transportation needs     Medical: Not on file     Non-medical: Not on file   Tobacco Use     Smoking status: Never Smoker     Smokeless tobacco: Never Used   Substance and Sexual Activity     Alcohol use: Never     Frequency: Never     Drug use: Never     Sexual activity: Not on file   Lifestyle     Physical activity     Days per week: Not on file     Minutes per session: Not on file     Stress: Not on file   Relationships     Social connections     Talks on phone: Not on file     Gets together: Not on file     Attends Voodoo service: Not on file     Active member of club or organization: Not on file     Attends meetings of clubs or organizations: Not on file     Relationship status: Not on file     Intimate partner violence     Fear of current or ex partner: Not on file     Emotionally abused: Not on file     Physically abused: Not on file     Forced sexual activity: Not on file   Other Topics Concern     Parent/sibling w/ CABG, MI or angioplasty before 65F 55M? Not Asked   Social History Narrative     Not on file       ROS:   CONSTITUTIONAL:  No fevers or chills  EYES: negative for icterus  ENT:  negative for hearing loss, tinnitus and sore throat  RESPIRATORY:  negative for cough, sputum, dyspnea  CARDIOVASCULAR:  negative for chest pain Fatigue  GASTROINTESTINAL:  negative for nausea, vomiting, diarrhea or constipation  GENITOURINARY:  negative for incontinence, dysuria, bladder emptying problems  HEME:  No easy bruising  INTEGUMENT:  negative for rash and pruritus  NEURO:  Negative for headache, seizure disorder    Allergies:   No Known Allergies    Medications:  Prescription Medications as of 12/9/2020       Rx Number Disp Refills Start End Last Dispensed Date Next Fill Date Owning Pharmacy    acetaminophen (TYLENOL) 325 MG tablet    12/3/2020        Sig: Take 325-650 mg by mouth    Class: Historical    Route: Oral    atorvastatin (LIPITOR) 40 MG tablet     10/6/2020 10/11/2021       Sig: Take 40 mg by mouth    Class: Historical    Route: Oral    blood glucose (CONTOUR NEXT TEST) test strip    2020        Sig: USE TO TEST BLOOD SUGARS 3 TIMES A DAY    Class: Historical    carvedilol (COREG) 12.5 MG tablet    2020        Sig: Take 12.5 mg by mouth    Class: Historical    Route: Oral    darbepoetin mike (ARANESP) 25 MCG/ML injection            Class: Historical    Route: Subcutaneous    ferric gluconate (FERRLECIT) 12.5 MG/ML injection            Class: Historical    Route: Intravenous    furosemide (LASIX) 40 MG tablet    6/3/2020        Sig: Take 80 mg by mouth    Class: Historical    Route: Oral    insulin aspart (NOVOLOG FLEXPEN) 100 UNIT/ML pen    2020        Sig: Inject 5 units with breakfast, 8 units with lunch and supper plus correction of 1 unit per 50 points over 150;  Max daily 80 Units    Class: Historical    insulin degludec (TRESIBA) 100 UNIT/ML pen    2020        Sig: Inject 13 Units Subcutaneous    Class: Historical    Route: Subcutaneous    levothyroxine (SYNTHROID/LEVOTHROID) 50 MCG tablet    2020        Sig: TAKE ONE TAB BY MOUTH EVERY DAY    Class: Historical    loperamide (IMODIUM) 2 MG capsule    10/6/2020        Sig: TAKE 2 CAPS AFTER THE FIRST LOOSE STOOL THEN 1 CAP AFTER EACH LOOSE STOOL MAX 8 CAPS PER DAY    Class: Historical    Multiple Vitamins-Minerals (PRESERVISION/LUTEIN) CAPS            Sig: Take 1 capsule by mouth    Class: Historical    Route: Oral    polyvinyl alcohol (LIQUIFILM TEARS) 1.4 % ophthalmic solution            Si drop    Class: Historical    sevelamer carbonate (RENVELA) 800 MG tablet    2020        Sig: TAKE 1 TABLET BY MOUTH 3X PER DAY WITH MEALS DO NOT GIVE ON AN EMPTY STOMACH MUST TAKE WITH FOOD    Class: Historical          Exam:   Constitutional - A&O in NAD.   Eyes - no redness or discharge.  Sclera anicteric  Respiratory - no cough, no labored breathing  Musculoskeletal - range of  motion normal  Skin - no discoloration, no jaundice  Neurological - no tremors.  No facial droop or dysarthria  Psychiatric - normal mood and affect    Abdomen - significant obesity associated with decreased mobility  The rest of a comprehensive physical examination is deferred due to PHE (public health emergency) video visit restrictions    Diagnostics:   No results found for this or any previous visit (from the past 672 hour(s)).  No results found for: CPRA

## 2020-12-09 NOTE — PROGRESS NOTES
[facility] Solid Organ Transplant  Outpatient MNT: Kidney Pancreas Transplant Evaluation    Current BMI: 29.9 (HT 69 in,  lbs/92 kg)- data from 9/8   BMI is within recommendation of <35 for KP transplant  Ideal BMI Goal for pancreas transplant <30 or <202 lbs / per surgeon discretion     Time Spent: 15 minutes  Visit Type: Initial   Referring Physician: Atiya   Pt accompanied by: self     History of previous txp: none   Frequency of BG checks: CGM, checks several times/day--A1c 7.2 per pt  Dialysis: yes    Dialysis Info: 4/2019 HD MWF afternoon   Protein supplement: no     Nutrition Assessment  Pt reports everything good (ie labs) at dialysis except phosphorus. Forgets to take binder. Is on table, but still forgets. Takes ~50% of the time. Is improving.     Vitamins, Supplements, Pertinent Meds: eye vitamin, MVI, renvela   Herbal Medicines/Supplements: none     Weight hx: has gained 5-6 lbs x 1 month, but pt unsure why    Edema: ?increase in water; mild if anything     Diet Recall  Breakfast English muffin with butter and jam; toast with nutella; Cheerios; scrambled eggs on weekends or off days     Lunch Tomato or chicken noodle soup (canned, not LS); tuna fish sandwich; leftovers    Dinner Hot dish; pork roast with mashed potatoes (double boil, has small amount), occasional gravy, carrots    Snacks Handful of pretzels, homemade cookies, occasional cuties     Beverages Ice, occasional diet Root Beer, ginger ale (has noticed phos on the label)    Alcohol None    Dining out 1x/week      Physical Activity  Elliptical 15 min/day   When shopping at [employer], stays longer to walk around store for exercise     Labs  12/2 K 4.5 Phos 6.3, with h/o high phos levels     Nutrition Diagnosis  No nutrition diagnosis identified at this time     Nutrition Intervention  Nutrition education provided:  Discussed sodium intake (low sodium foods and drinks, seasoning food without salt and tips for low sodium diet).  "Consider purchasing lower sodium canned goods and monitoring sodium intake more closely. Perhaps her weight gain is r/t fluid status and a lower sodium diet may help reduce this.     Reviewed protein needs for dialysis, K level, and elevated Phos levels. Pt reports she is getting better about taking her binders. Emailed pt kidney friendly meal and recipe ideas.     Reviewed post txp diet guidelines in brief (will review in further detail post txp):  (1) Review of proper food safety measures d/t immunosuppressant therapy post-op and increased risk for food-borne illness    (2) Avoid the following post txp d/t risk for rejection, unknown effects on the organs, and/or potential interactions with immunosuppressants:  - Herbal, Chinese, holistic, chiropractic, natural, alternative medicines and supplements  - Detoxes and cleanses  - Weight loss pills  - Protein powders or other products with extracts or herbs (ie green tea extract)    (3) Med regimen and possible side effects    Patient Understanding: Pt verbalized understanding of education provided.  Expected Engagement: Good  Follow-Up Plans: PRN     Nutrition Goals  No nutrition goals identified at this time     Zahraa Beltran, RD, LD, CCTD    Selene Denny is a 65 year old female who is being evaluated via a billable telephone visit.      The patient has been notified of following:     \"This telephone visit will be conducted via a call between you and your physician/provider. We have found that certain health care needs can be provided without the need for a physical exam.  This service lets us provide the care you need with a short phone conversation.  If a prescription is necessary we can send it directly to your pharmacy.  If lab work is needed we can place an order for that and you can then stop by our lab to have the test done at a later time.    Telephone visits are billed at different rates depending on your insurance coverage. During this emergency " "period, for some insurers they may be billed the same as an in-person visit.  Please reach out to your insurance provider with any questions.    If during the course of the call the physician/provider feels a telephone visit is not appropriate, you will not be charged for this service.\"    Patient has given verbal consent for Telephone visit? yes    Phone call duration: 15 minutes                                      "

## 2020-12-09 NOTE — LETTER
2020         RE: Selene Denny  [address]  [facility] [address]        Dear Colleague,    Thank you for referring your patient, Selene Denny, to the [facility]. Please see a copy of my visit note below.    Transplant Surgery Consult Note    Medical record number: [id number]  YOB: 1955,   Consult requested by Dr. Martinez for evaluation of kidney and pancreas transplant candidacy.    Assessment and Recommendations: Ms. Denny is a fair candidate for transplantation and has a good understanding of the risks and benefits of this approach to management of renal failure and diabetes. The following issues should be addressed prior to transplant:     64 yo with type 2 DM  On dialysis since 2019  Diabetic for 36 years  On insulin in the last couple of years  Insulin 35-45 untis/d  A1C 7.2  Triopathy+  No live donors at this time   But has several relatives she might ask    Based on risk factors, age and abdominal profile would recommend a kidney first and then plan on a ELIU if her kidney goes well and she's stable.    However, the odds of her qualifying for ELIU are low at this time    Ms. Denny has End stage renal failure due to diabetes mellitus type 2 whose condition is not expected to resolve, is expected to progress, and is expected to continue to develop related comorbid conditions.  Cardiology consult for cardiac risk stratification to be ordered: Yes  CT abdomen and pelvis without contrast to be ordered for assessment of vascular targets: Yes  Transplant listing labs ordered to include HLA, ABOx2, Cr, etc.  Dietician consult ordered: Yes  Social work consult ordered: Yes  Imaging reports reviewed:  yes  Radiology images reviewed:no      The majority of our visit was spent in counselling, discussing the medical and surgical risks of living or  donor kidney and pancreas transplantation, either in a simultaneous or sequential fashion. I contrasted  approximate wait time for SPK vs living vs  donor kidneys from normal (0-85%) or higher (%) kidney donor profile index (KDPI) donors and their associated outcomes. I would not recommend this individual to consider kidneys from high KDPI donors. The reason for this decision is best summarized as: multi-organ transplant candidate.  Access to transplant will be impacted by living donor availability and overall candidacy for SPK, as well as the influence of blood type and degree of sensitization. We discussed advantages of preemptive transplant as well as living donor kidney transplant, and graft and patient survival outcomes associated with these options. Potential surgical complications of kidney and pancreas transplantation include bleeding, clotting, infection, wound complications, anastomotic failure and other issues such as cardiac complications, pneumonia, deep venous thrombosis, pulmonary embolism, post transplant diabetes and death. We discussed the need for protocol biopsy of the kidney and the possible need for a ureteral stent (and subsequent removal). We discussed benefits and risks associated with different approaches to exocrine drainage of pancreatic secretions. We also discussed differences in the average length of stay, recovery process, and posttransplant lab and monitoring protocol. We discussed the risk of graft rejection and recurrent diabetic nephropathy in the setting of poor glycemic control. I emphasized the need for strict immunosuppression adherence and the potential for complications of immunosuppression such as skin cancer or lymphoma, as well as a very low but not zero risk of donor-derived disease transmission risks (infection, cancer). Ms. Denny asked good questions and the patient's candidacy will be reviewed at our Multidisciplinary Selection Committee. Thank you for the opportunity to participate in Ms. Denny's care.    Total time: 45 minutes  Counselling time: 35  minutes    .  ---------------------------------------------------------------------------------------------------    HPI: Ms. Denny has End stage renal failure due to diabetes mellitus type 2. The patient has had diabetes for 36 years. Management is by Humalog per diabetic educator  . The patient usually checks her blood sugar 3 times/day.   The diabetes is controlled.    Complications of diabetes include:    Retinopathy:  Yes   Neuropathy: Yes   Gastroparesis:  No    The patient is on dialysis.    Has potential kidney donors:  Doesn't know .  Interested in participation in paired exchange if a donor is willing: Doesn't know     The patient has the following pertinent history:       No    Yes  Dialysis:    []      [x] via:       Blood Transfusion                  []      [x]  Number of units:   Most recently:  Pregnancy:    []      [x] Number:       Previous Transplant:  [x]      [] Details:    Cancer    []      [x] Comment: CLL   Kidney stones   [x]      [] Comment:      Recurrent infections  [x]      []  Type:                  Bladder dysfunction  [x]      [] Cause:    Claudication   [x]      [] Distance:    Previous Amputation  []      [x] Cause:     Chronic anticoagulation  []      [x] Indication:  Sabianist  []      [x]     Past Medical History:   Diagnosis Date     Cancer (H)     CLL 2015     Congestive heart failure (H)      Coronary artery disease     Heart Attack      Diabetes (H)     Type 2 DM/Insulin Use      ESRD (end stage renal disease) (H)      History of blood transfusion     2017     Hypertension      Thyroid disease      Past Surgical History:   Procedure Laterality Date     ABDOMEN SURGERY      Gallbladder removed 4 years ago.      BIOPSY       CARDIAC SURGERY       COLONOSCOPY      2017 at [facility]      CORONARY ANGIOGRAPHY ADULT ORDER       GI SURGERY       HEART CATH, ANGIOPLASTY       No family history on file.  Social History     Socioeconomic History     Marital status:       Spouse name: Not on file     Number of children: Not on file     Years of education: Not on file     Highest education level: Not on file   Occupational History     Not on file   Social Needs     Financial resource strain: Not on file     Food insecurity     Worry: Not on file     Inability: Not on file     Transportation needs     Medical: Not on file     Non-medical: Not on file   Tobacco Use     Smoking status: Never Smoker     Smokeless tobacco: Never Used   Substance and Sexual Activity     Alcohol use: Never     Frequency: Never     Drug use: Never     Sexual activity: Not on file   Lifestyle     Physical activity     Days per week: Not on file     Minutes per session: Not on file     Stress: Not on file   Relationships     Social connections     Talks on phone: Not on file     Gets together: Not on file     Attends Islam service: Not on file     Active member of club or organization: Not on file     Attends meetings of clubs or organizations: Not on file     Relationship status: Not on file     Intimate partner violence     Fear of current or ex partner: Not on file     Emotionally abused: Not on file     Physically abused: Not on file     Forced sexual activity: Not on file   Other Topics Concern     Parent/sibling w/ CABG, MI or angioplasty before 65F 55M? Not Asked   Social History Narrative     Not on file       ROS:   CONSTITUTIONAL:  No fevers or chills  EYES: negative for icterus  ENT:  negative for hearing loss, tinnitus and sore throat  RESPIRATORY:  negative for cough, sputum, dyspnea  CARDIOVASCULAR:  negative for chest pain Fatigue  GASTROINTESTINAL:  negative for nausea, vomiting, diarrhea or constipation  GENITOURINARY:  negative for incontinence, dysuria, bladder emptying problems  HEME:  No easy bruising  INTEGUMENT:  negative for rash and pruritus  NEURO:  Negative for headache, seizure disorder    Allergies:   No Known Allergies    Medications:  Prescription Medications as of 12/9/2020        Rx Number Disp Refills Start End Last Dispensed Date Next Fill Date Owning Pharmacy    acetaminophen (TYLENOL) 325 MG tablet    12/3/2020        Sig: Take 325-650 mg by mouth    Class: Historical    Route: Oral    atorvastatin (LIPITOR) 40 MG tablet    10/6/2020 10/11/2021       Sig: Take 40 mg by mouth    Class: Historical    Route: Oral    blood glucose (CONTOUR NEXT TEST) test strip    2020        Sig: USE TO TEST BLOOD SUGARS 3 TIMES A DAY    Class: Historical    carvedilol (COREG) 12.5 MG tablet    2020        Sig: Take 12.5 mg by mouth    Class: Historical    Route: Oral    darbepoetin mike (ARANESP) 25 MCG/ML injection            Class: Historical    Route: Subcutaneous    ferric gluconate (FERRLECIT) 12.5 MG/ML injection            Class: Historical    Route: Intravenous    furosemide (LASIX) 40 MG tablet    6/3/2020        Sig: Take 80 mg by mouth    Class: Historical    Route: Oral    insulin aspart (NOVOLOG FLEXPEN) 100 UNIT/ML pen    2020        Sig: Inject 5 units with breakfast, 8 units with lunch and supper plus correction of 1 unit per 50 points over 150;  Max daily 80 Units    Class: Historical    insulin degludec (TRESIBA) 100 UNIT/ML pen    2020        Sig: Inject 13 Units Subcutaneous    Class: Historical    Route: Subcutaneous    levothyroxine (SYNTHROID/LEVOTHROID) 50 MCG tablet    2020        Sig: TAKE ONE TAB BY MOUTH EVERY DAY    Class: Historical    loperamide (IMODIUM) 2 MG capsule    10/6/2020        Sig: TAKE 2 CAPS AFTER THE FIRST LOOSE STOOL THEN 1 CAP AFTER EACH LOOSE STOOL MAX 8 CAPS PER DAY    Class: Historical    Multiple Vitamins-Minerals (PRESERVISION/LUTEIN) CAPS            Sig: Take 1 capsule by mouth    Class: Historical    Route: Oral    polyvinyl alcohol (LIQUIFILM TEARS) 1.4 % ophthalmic solution            Si drop    Class: Historical    sevelamer carbonate (RENVELA) 800 MG tablet    2020        Sig: TAKE 1 TABLET BY MOUTH 3X PER DAY  WITH MEALS DO NOT GIVE ON AN EMPTY STOMACH MUST TAKE WITH FOOD    Class: Historical          Exam:   Constitutional - A&O in NAD.   Eyes - no redness or discharge.  Sclera anicteric  Respiratory - no cough, no labored breathing  Musculoskeletal - range of motion normal  Skin - no discoloration, no jaundice  Neurological - no tremors.  No facial droop or dysarthria  Psychiatric - normal mood and affect    Abdomen - significant obesity associated with decreased mobility  The rest of a comprehensive physical examination is deferred due to PHE (public health emergency) video visit restrictions    Diagnostics:   No results found for this or any previous visit (from the past 672 hour(s)).  No results found for: CPRA      Again, thank you for allowing me to participate in the care of your patient.        Sincerely,        LOUIS

## 2020-12-11 PROBLEM — H35.373 EPIRETINAL MEMBRANE (ERM) OF BOTH EYES: Status: RESOLVED | Noted: 2018-07-05 | Resolved: 2020-12-11

## 2020-12-11 PROBLEM — H43.10 VITREOUS HEMORRHAGE (H): Status: RESOLVED | Noted: 2017-02-06 | Resolved: 2020-12-11

## 2020-12-11 PROBLEM — Z13.5 SCREENING FOR EYE CONDITION: Status: RESOLVED | Noted: 2017-02-06 | Resolved: 2020-12-11

## 2020-12-11 PROBLEM — H25.13 AGE-RELATED NUCLEAR CATARACT, BILATERAL: Status: RESOLVED | Noted: 2017-08-30 | Resolved: 2020-12-11

## 2020-12-11 PROBLEM — N18.5 CKD (CHRONIC KIDNEY DISEASE) STAGE 5, GFR LESS THAN 15 ML/MIN (H): Status: RESOLVED | Noted: 2019-03-08 | Resolved: 2020-12-11

## 2020-12-11 PROBLEM — R25.1 TREMOR: Status: RESOLVED | Noted: 2019-08-23 | Resolved: 2020-12-11

## 2020-12-11 PROBLEM — R11.2 NAUSEA AND VOMITING: Status: RESOLVED | Noted: 2017-11-09 | Resolved: 2020-12-11

## 2020-12-11 PROBLEM — S09.90XA INJURY OF HEAD: Status: RESOLVED | Noted: 2017-11-09 | Resolved: 2020-12-11

## 2020-12-11 PROBLEM — E87.5 HYPERKALEMIA: Status: RESOLVED | Noted: 2018-11-09 | Resolved: 2020-12-11

## 2020-12-11 PROBLEM — H26.9 CATARACT OF BOTH EYES: Status: RESOLVED | Noted: 2018-04-09 | Resolved: 2020-12-11

## 2020-12-11 PROBLEM — H43.10: Status: RESOLVED | Noted: 2017-10-31 | Resolved: 2020-12-11

## 2020-12-11 PROBLEM — E11.3299 BDR (BACKGROUND DIABETIC RETINOPATHY) (H): Status: RESOLVED | Noted: 2017-10-31 | Resolved: 2020-12-11

## 2020-12-11 PROBLEM — E11.39: Status: RESOLVED | Noted: 2017-10-31 | Resolved: 2020-12-11

## 2020-12-11 PROBLEM — J18.9 PNEUMONIA: Status: RESOLVED | Noted: 2018-03-19 | Resolved: 2020-12-11

## 2020-12-11 PROBLEM — E87.70 FLUID OVERLOAD, UNSPECIFIED: Status: RESOLVED | Noted: 2018-11-09 | Resolved: 2020-12-11

## 2020-12-11 PROBLEM — N04.9 NEPHROTIC SYNDROME: Status: RESOLVED | Noted: 2018-11-09 | Resolved: 2020-12-11

## 2020-12-11 PROBLEM — N25.81 HYPERPARATHYROIDISM DUE TO RENAL INSUFFICIENCY (H): Status: RESOLVED | Noted: 2019-05-06 | Resolved: 2020-12-11

## 2020-12-11 PROBLEM — N17.9 AKI (ACUTE KIDNEY INJURY) (H): Status: RESOLVED | Noted: 2018-04-06 | Resolved: 2020-12-11

## 2020-12-11 PROBLEM — H04.203 WATERY EYES: Status: RESOLVED | Noted: 2019-05-13 | Resolved: 2020-12-11

## 2020-12-11 PROBLEM — R50.9 FEVER: Status: RESOLVED | Noted: 2017-11-09 | Resolved: 2020-12-11

## 2020-12-11 PROBLEM — G72.3 ADYNAMIA: Status: RESOLVED | Noted: 2017-11-09 | Resolved: 2020-12-11

## 2020-12-11 PROBLEM — E11.311 DIABETIC MACULAR EDEMA OF RIGHT EYE (H): Status: RESOLVED | Noted: 2017-06-13 | Resolved: 2020-12-11

## 2020-12-11 PROBLEM — R51.9 HEADACHE: Status: RESOLVED | Noted: 2017-11-09 | Resolved: 2020-12-11

## 2020-12-11 PROBLEM — N18.4 CKD (CHRONIC KIDNEY DISEASE) STAGE 4, GFR 15-29 ML/MIN (H): Status: RESOLVED | Noted: 2017-05-12 | Resolved: 2020-12-11

## 2020-12-11 PROBLEM — R80.9 PROTEINURIA: Status: RESOLVED | Noted: 2018-11-09 | Resolved: 2020-12-11

## 2020-12-16 ENCOUNTER — COMMITTEE REVIEW (OUTPATIENT)
Dept: TRANSPLANT | Facility: CLINIC | Age: 65
End: 2020-12-16

## 2020-12-16 NOTE — COMMITTEE REVIEW
Abdominal Committee Review Note     Evaluation Date:   Committee Review Date: 12/16/2020    Organ being evaluated for: Kidney/Pancreas    Transplant Phase: Referral  Transplant Status: Active    Transplant Coordinator: Padma Mishra  Transplant Surgeon: Dr. Rajput    Referring Physician: Milvia Martinez    Primary Diagnosis: ESRD  Secondary Diagnosis: Diabetic Nephropathy    Committee Review Members:  Nephrology Julito Pitts, APRN CNP, Roel Ashton MD   Nurse Padma Mishra, RN, Patricia Spencer, RN, aYmini Cho, GENO   Nutrition Zahraa Beltran,    Pharmacy Dwight Zhao [facility]    - Clinical Monika Veliz, MSW, Debra Spain, MSW   Transplant Akila Layne PA-C, Kerrie Molina, RN, Ana Young, GENO, Patricia Samano, NP, Patricia Spencer, RN, Ellyn Guzman, GENO, Maris Rajput MD, Gerson Dorantes MD, Tiffany Bruno RN       Transplant Eligibility: Irreversible chronic kidney disease treated w/dialysis or expected need for dialysis, Insulin-dependent diabetes mellitus    Committee Review Decision: Needs Re-presentation    Relative Contraindications: Other     Absolute Contraindications:     Committee Chair Maris Rajput MD verbally attested to the committee's decision.    Committee Discussion Details:Reviewed pt's medical status and evaluation results to date.  Pt is determined to be an approved candidate for Kidney alone transplant. Per Dr. Rajput's conversation w/ the pt, she is being worked up at a facility closer to her home and was referred to the [facility] for Kidney-pancreas transplant.  Per Dr. Rajput, the pt is to decide if she would like to continue a pre-kidney transplant evaluation at this facility.  If she would like to pursue a Kidney alone, the following items be completed as part of the pt's evaluation: cardiac risk assessment, continue to work w/ GI for management of diarrhea and bowel incontinence, AB/Pelv CT,  mammogram, dental up to date.  Will continue pre-kidney evaluation pending the pt's decision on which facility to work with.

## 2020-12-18 ENCOUNTER — TELEPHONE (OUTPATIENT)
Dept: TRANSPLANT | Facility: CLINIC | Age: 65
End: 2020-12-18

## 2020-12-18 NOTE — TELEPHONE ENCOUNTER
Called both home and mobile numbers to discuss outcome of selection committee.  Left a VM for pt to return my call.

## 2020-12-21 ENCOUNTER — TELEPHONE (OUTPATIENT)
Dept: TRANSPLANT | Facility: CLINIC | Age: 65
End: 2020-12-21

## 2020-12-21 NOTE — TELEPHONE ENCOUNTER
Pt returned my call from last week to review Selection Committee Results.  I informed her that she is approved for Kidney alone, not K/P, per our committee discussion, Dr. Rajput recommended the pt choose which facility she would like to continue her pre-kidney transplant evaluation, the pt confirmed she would like to finish her evaluation and list w/ [facility]. Will close pre-transplant episode.  Pt had no further questions for me.

## 2021-01-04 ENCOUNTER — HEALTH MAINTENANCE LETTER (OUTPATIENT)
Age: 66
End: 2021-01-04

## 2021-05-01 ENCOUNTER — HEALTH MAINTENANCE LETTER (OUTPATIENT)
Age: 66
End: 2021-05-01

## 2021-08-15 ENCOUNTER — HEALTH MAINTENANCE LETTER (OUTPATIENT)
Age: 66
End: 2021-08-15

## 2021-10-11 ENCOUNTER — HEALTH MAINTENANCE LETTER (OUTPATIENT)
Age: 66
End: 2021-10-11

## 2021-12-05 ENCOUNTER — HEALTH MAINTENANCE LETTER (OUTPATIENT)
Age: 66
End: 2021-12-05

## 2022-03-27 ENCOUNTER — HEALTH MAINTENANCE LETTER (OUTPATIENT)
Age: 67
End: 2022-03-27

## 2022-07-17 ENCOUNTER — HEALTH MAINTENANCE LETTER (OUTPATIENT)
Age: 67
End: 2022-07-17

## 2022-09-24 ENCOUNTER — HEALTH MAINTENANCE LETTER (OUTPATIENT)
Age: 67
End: 2022-09-24

## 2023-01-29 ENCOUNTER — HEALTH MAINTENANCE LETTER (OUTPATIENT)
Age: 68
End: 2023-01-29

## 2023-05-08 ENCOUNTER — HEALTH MAINTENANCE LETTER (OUTPATIENT)
Age: 68
End: 2023-05-08

## 2023-10-14 ENCOUNTER — HEALTH MAINTENANCE LETTER (OUTPATIENT)
Age: 68
End: 2023-10-14

## 2024-02-25 ENCOUNTER — HEALTH MAINTENANCE LETTER (OUTPATIENT)
Age: 69
End: 2024-02-25